# Patient Record
Sex: MALE | Race: ASIAN | NOT HISPANIC OR LATINO | Employment: FULL TIME | URBAN - METROPOLITAN AREA
[De-identification: names, ages, dates, MRNs, and addresses within clinical notes are randomized per-mention and may not be internally consistent; named-entity substitution may affect disease eponyms.]

---

## 2021-10-05 ENCOUNTER — OFFICE VISIT (OUTPATIENT)
Dept: MEDICAL GROUP | Facility: PHYSICIAN GROUP | Age: 31
End: 2021-10-05
Payer: COMMERCIAL

## 2021-10-05 ENCOUNTER — TELEPHONE (OUTPATIENT)
Dept: SCHEDULING | Facility: IMAGING CENTER | Age: 31
End: 2021-10-05

## 2021-10-05 VITALS
TEMPERATURE: 97 F | SYSTOLIC BLOOD PRESSURE: 130 MMHG | BODY MASS INDEX: 43.23 KG/M2 | HEIGHT: 70 IN | RESPIRATION RATE: 12 BRPM | OXYGEN SATURATION: 97 % | WEIGHT: 302 LBS | DIASTOLIC BLOOD PRESSURE: 78 MMHG | HEART RATE: 89 BPM

## 2021-10-05 DIAGNOSIS — Z00.00 GENERAL MEDICAL EXAM: ICD-10-CM

## 2021-10-05 DIAGNOSIS — Z23 NEED FOR VACCINATION: ICD-10-CM

## 2021-10-05 PROCEDURE — 90471 IMMUNIZATION ADMIN: CPT | Performed by: PHYSICIAN ASSISTANT

## 2021-10-05 PROCEDURE — 99203 OFFICE O/P NEW LOW 30 MIN: CPT | Mod: 25 | Performed by: PHYSICIAN ASSISTANT

## 2021-10-05 PROCEDURE — 90715 TDAP VACCINE 7 YRS/> IM: CPT | Performed by: PHYSICIAN ASSISTANT

## 2021-10-05 ASSESSMENT — PATIENT HEALTH QUESTIONNAIRE - PHQ9: CLINICAL INTERPRETATION OF PHQ2 SCORE: 0

## 2021-10-05 NOTE — PROGRESS NOTES
"CC:   Chief Complaint   Patient presents with   • Establish Care          HISTORY OF PRESENT ILLNESS: Patient is a 31 y.o. male established patient who presents today to establish care with me and discuss the following issues:      Health Maintenance: Completed   Will get dtap today.     General medical exam  Patient here to establish care.  Has no acute complaints.      Patient Active Problem List    Diagnosis Date Noted   • General medical exam 10/05/2021      Allergies:Patient has no known allergies.    No current outpatient medications on file.     No current facility-administered medications for this visit.       Social History     Tobacco Use   • Smoking status: Never Smoker   • Smokeless tobacco: Never Used   Vaping Use   • Vaping Use: Never used   Substance Use Topics   • Alcohol use: Never   • Drug use: Never     Social History     Social History Narrative   • Not on file       Family History   Problem Relation Age of Onset   • Heart Disease Father         passed away in 50's.        Review of Systems:    Constitutional: No Fevers, Chills  Eyes: No vision changes  ENT: No hearing changes  Resp: No Shortness of breath  CV: No Chest pain  GI: No Nausea/Vomiting  MSK: No weakness  Skin: No rashes  Neuro: No Headaches  Psych: No Suicidal ideations    All remaining systems reviewed and found to be negative, except as stated above.    Exam:    /78   Pulse 89   Temp 36.1 °C (97 °F) (Temporal)   Resp 12   Ht 1.765 m (5' 9.5\")   Wt (!) 137 kg (302 lb)   SpO2 97%  Body mass index is 43.96 kg/m².    General:  Well nourished, well developed male in NAD  HENT: Atraumatic, normocephalic  EYES: Extraocular movements intact  NECK: Supple, FROM  CHEST: No deformities, Equal chest expansion  RESP: Unlabored, no stridor or audible wheeze  HEART: Regular Rate and rhythm.   ABD: Soft, Nontender, Non-Distended  Extremities: No Clubbing, Cyanosis, or Edema  Skin: Warm/dry, without rashes  Neuro: A/O x 4, due to " COVID-19- did not have patient remove face mask to test cranial nerves.  Motor/sensory grossly intact  Psych: Normal behavior, normal affect     Assessment/Plan:  1. General medical exam  - Lipid Profile; Future  - TSH; Future  - Comp Metabolic Panel; Future    2. Need for vaccination  - Tdap Vaccine =>8YO IM    3. BMI 40.0-44.9, adult (HCC)  Discussed with patient today implementing exercise regimen, starting to watch his diet.  We will get baseline labs.  Would like him to follow-up in 2 to 4 weeks to review in detail.  Discussed that would be advantageous for him to work on weight loss regimen.    Follow-up: Return in about 4 weeks (around 11/2/2021) for Follow up on labs.    Please note that this dictation was created using voice recognition software. I have made every reasonable attempt to correct obvious errors, but I expect that there are errors of grammar and possibly content that I did not discover before finalizing the note.

## 2021-10-11 ENCOUNTER — HOSPITAL ENCOUNTER (OUTPATIENT)
Dept: LAB | Facility: MEDICAL CENTER | Age: 31
End: 2021-10-11
Attending: PHYSICIAN ASSISTANT
Payer: COMMERCIAL

## 2021-10-11 DIAGNOSIS — Z00.00 GENERAL MEDICAL EXAM: ICD-10-CM

## 2021-10-11 LAB
ALBUMIN SERPL BCP-MCNC: 4.4 G/DL (ref 3.2–4.9)
ALBUMIN/GLOB SERPL: 1.3 G/DL
ALP SERPL-CCNC: 49 U/L (ref 30–99)
ALT SERPL-CCNC: 28 U/L (ref 2–50)
ANION GAP SERPL CALC-SCNC: 12 MMOL/L (ref 7–16)
AST SERPL-CCNC: 22 U/L (ref 12–45)
BILIRUB SERPL-MCNC: 0.4 MG/DL (ref 0.1–1.5)
BUN SERPL-MCNC: 13 MG/DL (ref 8–22)
CALCIUM SERPL-MCNC: 9 MG/DL (ref 8.5–10.5)
CHLORIDE SERPL-SCNC: 101 MMOL/L (ref 96–112)
CHOLEST SERPL-MCNC: 214 MG/DL (ref 100–199)
CO2 SERPL-SCNC: 25 MMOL/L (ref 20–33)
CREAT SERPL-MCNC: 0.81 MG/DL (ref 0.5–1.4)
GLOBULIN SER CALC-MCNC: 3.4 G/DL (ref 1.9–3.5)
GLUCOSE SERPL-MCNC: 87 MG/DL (ref 65–99)
HDLC SERPL-MCNC: 32 MG/DL
LDLC SERPL CALC-MCNC: 143 MG/DL
POTASSIUM SERPL-SCNC: 4.7 MMOL/L (ref 3.6–5.5)
PROT SERPL-MCNC: 7.8 G/DL (ref 6–8.2)
SODIUM SERPL-SCNC: 138 MMOL/L (ref 135–145)
TRIGL SERPL-MCNC: 196 MG/DL (ref 0–149)
TSH SERPL DL<=0.005 MIU/L-ACNC: 2.13 UIU/ML (ref 0.38–5.33)

## 2021-10-11 PROCEDURE — 84443 ASSAY THYROID STIM HORMONE: CPT

## 2021-10-11 PROCEDURE — 80053 COMPREHEN METABOLIC PANEL: CPT

## 2021-10-11 PROCEDURE — 80061 LIPID PANEL: CPT

## 2021-10-11 PROCEDURE — 36415 COLL VENOUS BLD VENIPUNCTURE: CPT

## 2021-10-12 ENCOUNTER — TELEPHONE (OUTPATIENT)
Dept: MEDICAL GROUP | Facility: PHYSICIAN GROUP | Age: 31
End: 2021-10-12

## 2021-10-12 NOTE — LETTER
October 15, 2021        Alex Ayers P.A.-C has received your lab results and would like to discuss them with you.  Please call [phone] to schedule an appointment          Thank-you,  Alvin GRIJALVA M.A.

## 2021-10-13 NOTE — TELEPHONE ENCOUNTER
Phone Number Called: [phone] (home)       Call outcome: Did not leave a detailed message. Requested patient to call back.    Message: 2nd attempt

## 2021-10-13 NOTE — TELEPHONE ENCOUNTER
----- Message from Clotilde Ayers P.A.-C. sent at 10/12/2021  8:59 AM PDT -----  Please call patient about their results.     There are a few things on their results I'd like to talk about. Please schedule a follow up.     Thank you,    Clotilde Ayers P.A.-C.

## 2021-10-15 NOTE — TELEPHONE ENCOUNTER
Phone Number Called: [phone] (home)       Call outcome: Did not leave a detailed message. Requested patient to call back.    Message: 3rd attempt-letter sent

## 2021-10-19 ENCOUNTER — OFFICE VISIT (OUTPATIENT)
Dept: MEDICAL GROUP | Facility: PHYSICIAN GROUP | Age: 31
End: 2021-10-19
Payer: COMMERCIAL

## 2021-10-19 VITALS
BODY MASS INDEX: 42.8 KG/M2 | SYSTOLIC BLOOD PRESSURE: 122 MMHG | RESPIRATION RATE: 16 BRPM | TEMPERATURE: 98.4 F | OXYGEN SATURATION: 96 % | DIASTOLIC BLOOD PRESSURE: 86 MMHG | WEIGHT: 299 LBS | HEART RATE: 115 BPM | HEIGHT: 70 IN

## 2021-10-19 DIAGNOSIS — E78.5 DYSLIPIDEMIA: ICD-10-CM

## 2021-10-19 PROCEDURE — 99213 OFFICE O/P EST LOW 20 MIN: CPT | Performed by: PHYSICIAN ASSISTANT

## 2021-10-19 NOTE — ASSESSMENT & PLAN NOTE
This is a chronic condition.   Latest Labs:   Lab Results   Component Value Date/Time    CHOLSTRLTOT 214 (H) 10/11/2021 10:26 AM     (H) 10/11/2021 10:26 AM    HDL 32 (A) 10/11/2021 10:26 AM    TRIGLYCERIDE 196 (H) 10/11/2021 10:26 AM      Medications: none discussed lifestyle modifications versus medications.     Diet/Exercise: Diet could use improvement. Discussed aerobic exercises. He is busy at work.   Family History of high cholesterol or heart disease?

## 2021-10-19 NOTE — PROGRESS NOTES
"CC:   Chief Complaint   Patient presents with   • Lab Results     follow up          HISTORY OF PRESENT ILLNESS: Patient is a 31 y.o. male established patient who presents today to follow up on the following conditions:       Health Maintenance: Completed    Dyslipidemia  This is a chronic condition.   Latest Labs:   Lab Results   Component Value Date/Time    CHOLSTRLTOT 214 (H) 10/11/2021 10:26 AM     (H) 10/11/2021 10:26 AM    HDL 32 (A) 10/11/2021 10:26 AM    TRIGLYCERIDE 196 (H) 10/11/2021 10:26 AM      Medications: none discussed lifestyle modifications versus medications.     Diet/Exercise: Diet could use improvement. Discussed aerobic exercises. He is busy at work.   Family History of high cholesterol or heart disease?         Patient Active Problem List    Diagnosis Date Noted   • Dyslipidemia 10/19/2021   • General medical exam 10/05/2021      Allergies:Patient has no known allergies.    No current outpatient medications on file.     No current facility-administered medications for this visit.       Social History     Tobacco Use   • Smoking status: Never Smoker   • Smokeless tobacco: Never Used   Vaping Use   • Vaping Use: Never used   Substance Use Topics   • Alcohol use: Never   • Drug use: Never     Social History     Social History Narrative   • Not on file       Family History   Problem Relation Age of Onset   • Heart Disease Father         passed away in 50's.        Review of Systems:    Constitutional: No Fevers, Chills  Eyes: No vision changes  ENT: No hearing changes  Resp: No Shortness of breath  CV: No Chest pain  GI: No Nausea/Vomiting  MSK: No weakness  Skin: No rashes  Neuro: No Headaches  Psych: No Suicidal ideations    All remaining systems reviewed and found to be negative, except as stated above.    Exam:    /86   Pulse (!) 115   Temp 36.9 °C (98.4 °F)   Resp 16   Ht 1.778 m (5' 10\")   Wt (!) 136 kg (299 lb)   SpO2 96%  Body mass index is 42.9 kg/m².    General:  Well " nourished, well developed male in NAD  HENT: Atraumatic, normocephalic  EYES: Extraocular movements intact  NECK: Supple, FROM  CHEST: No deformities, Equal chest expansion  RESP: Unlabored, no stridor or audible wheeze  HEART: Regular Rate and rhythm.   Extremities: No Clubbing, Cyanosis, or Edema  Skin: Warm/dry, without rashes  Neuro: A/O x 4, due to COVID-19- did not have patient remove face mask to test cranial nerves.  Motor/sensory grossly intact  Psych: Normal behavior, normal affect      Lab review:  Labs are reviewed and discussed with a patient  Lab Results   Component Value Date/Time    CHOLSTRLTOT 214 (H) 10/11/2021 10:26 AM     (H) 10/11/2021 10:26 AM    HDL 32 (A) 10/11/2021 10:26 AM    TRIGLYCERIDE 196 (H) 10/11/2021 10:26 AM       Lab Results   Component Value Date/Time    SODIUM 138 10/11/2021 10:26 AM    POTASSIUM 4.7 10/11/2021 10:26 AM    CHLORIDE 101 10/11/2021 10:26 AM    CO2 25 10/11/2021 10:26 AM    GLUCOSE 87 10/11/2021 10:26 AM    BUN 13 10/11/2021 10:26 AM    CREATININE 0.81 10/11/2021 10:26 AM     Lab Results   Component Value Date/Time    ALKPHOSPHAT 49 10/11/2021 10:26 AM    ASTSGOT 22 10/11/2021 10:26 AM    ALTSGPT 28 10/11/2021 10:26 AM    TBILIRUBIN 0.4 10/11/2021 10:26 AM          Assessment/Plan:  1. Dyslipidemia  Encouraged patient to work on lifestyle modifications.  Repeat labs in 6 months.  Wrote out instructions for him to increase fiber intake, reduce foods high in fat, avoid fast food.     Follow-up: Return in about 6 months (around 4/19/2022) for Follow up on labs.    Please note that this dictation was created using voice recognition software. I have made every reasonable attempt to correct obvious errors, but I expect that there are errors of grammar and possibly content that I did not discover before finalizing the note.

## 2022-04-19 ENCOUNTER — HOSPITAL ENCOUNTER (OUTPATIENT)
Dept: LAB | Facility: MEDICAL CENTER | Age: 32
End: 2022-04-19
Attending: PHYSICIAN ASSISTANT
Payer: COMMERCIAL

## 2022-04-19 DIAGNOSIS — E78.5 DYSLIPIDEMIA: ICD-10-CM

## 2022-04-19 LAB
CHOLEST SERPL-MCNC: 209 MG/DL (ref 100–199)
HDLC SERPL-MCNC: 31 MG/DL
LDLC SERPL CALC-MCNC: 111 MG/DL
TRIGL SERPL-MCNC: 333 MG/DL (ref 0–149)

## 2022-04-19 PROCEDURE — 80061 LIPID PANEL: CPT

## 2022-04-19 PROCEDURE — 36415 COLL VENOUS BLD VENIPUNCTURE: CPT
